# Patient Record
Sex: FEMALE | Race: WHITE | URBAN - METROPOLITAN AREA
[De-identification: names, ages, dates, MRNs, and addresses within clinical notes are randomized per-mention and may not be internally consistent; named-entity substitution may affect disease eponyms.]

---

## 2018-07-25 ENCOUNTER — OFFICE VISIT (OUTPATIENT)
Dept: URGENT CARE | Facility: URGENT CARE | Age: 38
End: 2018-07-25
Payer: OTHER MISCELLANEOUS

## 2018-07-25 ENCOUNTER — RADIANT APPOINTMENT (OUTPATIENT)
Dept: GENERAL RADIOLOGY | Facility: CLINIC | Age: 38
End: 2018-07-25
Attending: PHYSICIAN ASSISTANT
Payer: OTHER MISCELLANEOUS

## 2018-07-25 VITALS
DIASTOLIC BLOOD PRESSURE: 71 MMHG | HEIGHT: 72 IN | SYSTOLIC BLOOD PRESSURE: 108 MMHG | OXYGEN SATURATION: 100 % | BODY MASS INDEX: 23.7 KG/M2 | HEART RATE: 61 BPM | WEIGHT: 175 LBS | TEMPERATURE: 97.8 F

## 2018-07-25 DIAGNOSIS — M54.2 ACUTE NECK PAIN: ICD-10-CM

## 2018-07-25 DIAGNOSIS — S13.4XXA ACUTE WHIPLASH INJURY, INITIAL ENCOUNTER: Primary | ICD-10-CM
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
## 2018-07-25 PROCEDURE — 99204 OFFICE O/P NEW MOD 45 MIN: CPT | Performed by: PHYSICIAN ASSISTANT

## 2018-07-25 PROCEDURE — 72040 X-RAY EXAM NECK SPINE 2-3 VW: CPT

## 2018-07-25 RX ORDER — NAPROXEN 500 MG/1
500 TABLET ORAL 2 TIMES DAILY WITH MEALS
Qty: 20 TABLET | Refills: 0 | Status: SHIPPED | OUTPATIENT
Start: 2018-07-25

## 2018-07-25 RX ORDER — CYCLOBENZAPRINE HCL 5 MG
5 TABLET ORAL 3 TIMES DAILY PRN
Qty: 10 TABLET | Refills: 0 | Status: SHIPPED | OUTPATIENT
Start: 2018-07-25

## 2018-07-25 ASSESSMENT — ENCOUNTER SYMPTOMS
VOMITING: 0
BRUISES/BLEEDS EASILY: 0
SENSORY CHANGE: 0
HEMATURIA: 0
FOCAL WEAKNESS: 0
TINGLING: 0
SPEECH CHANGE: 0
DIZZINESS: 1
HEADACHES: 1
SHORTNESS OF BREATH: 0
PSYCHIATRIC NEGATIVE: 1
ABDOMINAL PAIN: 0
COUGH: 0
BLURRED VISION: 0
LOSS OF CONSCIOUSNESS: 0
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
